# Patient Record
Sex: FEMALE | Race: WHITE | Employment: UNEMPLOYED | URBAN - METROPOLITAN AREA
[De-identification: names, ages, dates, MRNs, and addresses within clinical notes are randomized per-mention and may not be internally consistent; named-entity substitution may affect disease eponyms.]

---

## 2019-09-06 ENCOUNTER — OFFICE VISIT (OUTPATIENT)
Dept: INTERNAL MEDICINE CLINIC | Facility: CLINIC | Age: 59
End: 2019-09-06
Payer: COMMERCIAL

## 2019-09-06 VITALS
DIASTOLIC BLOOD PRESSURE: 89 MMHG | BODY MASS INDEX: 35.44 KG/M2 | WEIGHT: 200 LBS | OXYGEN SATURATION: 90 % | HEART RATE: 80 BPM | HEIGHT: 63 IN | SYSTOLIC BLOOD PRESSURE: 156 MMHG

## 2019-09-06 DIAGNOSIS — I10 ESSENTIAL HYPERTENSION: Primary | ICD-10-CM

## 2019-09-06 DIAGNOSIS — Z12.31 BREAST CANCER SCREENING BY MAMMOGRAM: ICD-10-CM

## 2019-09-06 DIAGNOSIS — Z23 NEED FOR PNEUMOCOCCAL VACCINATION: ICD-10-CM

## 2019-09-06 DIAGNOSIS — E78.5 DYSLIPIDEMIA: ICD-10-CM

## 2019-09-06 DIAGNOSIS — E04.1 THYROID NODULE: ICD-10-CM

## 2019-09-06 DIAGNOSIS — N20.0 NEPHROLITHIASIS: ICD-10-CM

## 2019-09-06 DIAGNOSIS — F32.5 MAJOR DEPRESSIVE DISORDER WITH SINGLE EPISODE, IN FULL REMISSION (HCC): ICD-10-CM

## 2019-09-06 DIAGNOSIS — Z12.11 SCREEN FOR COLON CANCER: ICD-10-CM

## 2019-09-06 PROBLEM — F32.A DEPRESSION: Status: ACTIVE | Noted: 2019-09-06

## 2019-09-06 PROCEDURE — 90732 PPSV23 VACC 2 YRS+ SUBQ/IM: CPT | Performed by: INTERNAL MEDICINE

## 2019-09-06 PROCEDURE — 99204 OFFICE O/P NEW MOD 45 MIN: CPT | Performed by: INTERNAL MEDICINE

## 2019-09-06 PROCEDURE — 90471 IMMUNIZATION ADMIN: CPT | Performed by: INTERNAL MEDICINE

## 2019-09-06 RX ORDER — CARVEDILOL 25 MG/1
1 TABLET ORAL 2 TIMES DAILY
Refills: 1 | COMMUNITY
Start: 2019-08-15 | End: 2019-09-16

## 2019-09-06 RX ORDER — CITALOPRAM 10 MG/1
1 TABLET ORAL DAILY
Refills: 0 | COMMUNITY
Start: 2019-05-13 | End: 2019-09-06

## 2019-09-06 RX ORDER — AMLODIPINE BESYLATE 10 MG/1
1 TABLET ORAL DAILY
Refills: 0 | COMMUNITY
Start: 2019-06-02 | End: 2019-09-16

## 2019-09-06 RX ORDER — DOXAZOSIN 2 MG/1
1 TABLET ORAL NIGHTLY
Refills: 0 | COMMUNITY
Start: 2019-07-26 | End: 2019-09-06

## 2019-09-06 RX ORDER — SIMVASTATIN 40 MG
1 TABLET ORAL DAILY
Refills: 1 | COMMUNITY
Start: 2019-07-08 | End: 2020-07-20

## 2019-09-06 RX ORDER — CITALOPRAM 10 MG/1
10 TABLET ORAL DAILY
Qty: 90 TABLET | Refills: 1 | Status: SHIPPED | OUTPATIENT
Start: 2019-09-06 | End: 2020-03-05

## 2019-09-06 RX ORDER — BENAZEPRIL HYDROCHLORIDE 40 MG/1
1 TABLET, FILM COATED ORAL DAILY
Refills: 1 | COMMUNITY
Start: 2019-08-15 | End: 2019-09-16

## 2019-09-06 RX ORDER — DOXAZOSIN 2 MG/1
2 TABLET ORAL NIGHTLY
Qty: 90 TABLET | Refills: 1 | Status: SHIPPED | OUTPATIENT
Start: 2019-09-06 | End: 2020-02-20

## 2019-09-06 NOTE — PROGRESS NOTES
Juan Pablo Sanches is a 60year old female. Patient presents with:  Establish Care    HPI:   28-year-old female with a past medical history of hypertension, depression, nephrolithiasis, thyroid nodule status post hemithyroidectomy here to establish care.   Patient r Smoker        Years: 30.00        Types: Cigarettes      Smokeless tobacco: Never Used    Alcohol use: Not Currently    Drug use: Not Currently     Family History   Problem Relation Age of Onset   • Cancer Father    • Hypertension Mother    • Heart Disorde hemithyroidectomy. Will check TSH.  - TSH W REFLEX TO FREE T4; Future    3. Dyslipidemia  On statins. Will check lipid profile and adjust medication accordingly.  - LIPID PANEL; Future  - HEMOGLOBIN A1C; Future    4.  Nephrolithiasis  Status post removal

## 2019-09-06 NOTE — PATIENT INSTRUCTIONS
As discussed, your blood pressure is running little bit high. Please try to avoid salt as much as possible and try to abstain from smoking. We will recheck your blood pressure again with next visit and if it is still high we will adjust medications.   We

## 2019-09-17 RX ORDER — BENAZEPRIL HYDROCHLORIDE 40 MG/1
TABLET, FILM COATED ORAL
Qty: 90 TABLET | Refills: 0 | Status: SHIPPED | OUTPATIENT
Start: 2019-09-17 | End: 2019-11-21

## 2019-09-17 RX ORDER — AMLODIPINE BESYLATE 10 MG/1
TABLET ORAL DAILY
Qty: 90 TABLET | Refills: 0 | Status: SHIPPED | OUTPATIENT
Start: 2019-09-17 | End: 2020-03-11

## 2019-09-17 RX ORDER — CARVEDILOL 25 MG/1
TABLET ORAL
Qty: 180 TABLET | Refills: 0 | Status: SHIPPED | OUTPATIENT
Start: 2019-09-17 | End: 2019-10-17

## 2019-10-05 ENCOUNTER — LAB ENCOUNTER (OUTPATIENT)
Dept: LAB | Age: 59
End: 2019-10-05
Attending: INTERNAL MEDICINE
Payer: COMMERCIAL

## 2019-10-05 DIAGNOSIS — E04.1 THYROID NODULE: ICD-10-CM

## 2019-10-05 DIAGNOSIS — I10 ESSENTIAL HYPERTENSION: ICD-10-CM

## 2019-10-05 DIAGNOSIS — E78.5 DYSLIPIDEMIA: ICD-10-CM

## 2019-10-05 PROCEDURE — 36415 COLL VENOUS BLD VENIPUNCTURE: CPT

## 2019-10-05 PROCEDURE — 82274 ASSAY TEST FOR BLOOD FECAL: CPT | Performed by: INTERNAL MEDICINE

## 2019-10-05 PROCEDURE — 84443 ASSAY THYROID STIM HORMONE: CPT

## 2019-10-05 PROCEDURE — 85060 BLOOD SMEAR INTERPRETATION: CPT

## 2019-10-05 PROCEDURE — 80053 COMPREHEN METABOLIC PANEL: CPT

## 2019-10-05 PROCEDURE — 80061 LIPID PANEL: CPT

## 2019-10-05 PROCEDURE — 85027 COMPLETE CBC AUTOMATED: CPT

## 2019-10-05 PROCEDURE — 83036 HEMOGLOBIN GLYCOSYLATED A1C: CPT

## 2019-10-07 DIAGNOSIS — R19.5 HEME POSITIVE STOOL: Primary | ICD-10-CM

## 2019-10-07 NOTE — PROGRESS NOTES
Spoke with patient (verified name and )< advised Dr Shaw Showsanjay note and verbalized understanding. GI specialist information given.     Notes recorded by Dinesh Jiménez MD on 10/7/2019 at 3:27 PM CDT  I reviewed her stool test. Shashi Lawrence is blood in the sto

## 2019-10-13 ENCOUNTER — HOSPITAL ENCOUNTER (EMERGENCY)
Facility: HOSPITAL | Age: 59
Discharge: HOME | End: 2019-10-13
Attending: EMERGENCY MEDICINE
Payer: COMMERCIAL

## 2019-10-13 ENCOUNTER — APPOINTMENT (OUTPATIENT)
Dept: GENERAL RADIOLOGY | Facility: HOSPITAL | Age: 59
End: 2019-10-13
Attending: EMERGENCY MEDICINE
Payer: COMMERCIAL

## 2019-10-13 VITALS
OXYGEN SATURATION: 93 % | TEMPERATURE: 98 F | DIASTOLIC BLOOD PRESSURE: 80 MMHG | HEIGHT: 63 IN | WEIGHT: 190 LBS | SYSTOLIC BLOOD PRESSURE: 149 MMHG | RESPIRATION RATE: 20 BRPM | HEART RATE: 87 BPM | BODY MASS INDEX: 33.66 KG/M2

## 2019-10-13 DIAGNOSIS — M54.16 LUMBAR RADICULOPATHY: Primary | ICD-10-CM

## 2019-10-13 PROCEDURE — 99283 EMERGENCY DEPT VISIT LOW MDM: CPT

## 2019-10-13 PROCEDURE — 72100 X-RAY EXAM L-S SPINE 2/3 VWS: CPT | Performed by: EMERGENCY MEDICINE

## 2019-10-13 RX ORDER — HYDROCODONE BITARTRATE AND ACETAMINOPHEN 5; 325 MG/1; MG/1
1 TABLET ORAL EVERY 6 HOURS PRN
Qty: 10 TABLET | Refills: 0 | Status: SHIPPED | OUTPATIENT
Start: 2019-10-13 | End: 2019-11-07 | Stop reason: ALTCHOICE

## 2019-10-13 RX ORDER — CYCLOBENZAPRINE HCL 10 MG
10 TABLET ORAL 3 TIMES DAILY PRN
Qty: 20 TABLET | Refills: 0 | Status: SHIPPED | OUTPATIENT
Start: 2019-10-13 | End: 2019-10-20

## 2019-10-13 RX ORDER — PREDNISONE 20 MG/1
40 TABLET ORAL ONCE
Status: COMPLETED | OUTPATIENT
Start: 2019-10-13 | End: 2019-10-13

## 2019-10-13 RX ORDER — METHYLPREDNISOLONE 4 MG/1
TABLET ORAL
Qty: 1 PACKAGE | Refills: 0 | Status: SHIPPED | OUTPATIENT
Start: 2019-10-13 | End: 2019-11-07 | Stop reason: ALTCHOICE

## 2019-10-13 RX ORDER — HYDROCODONE BITARTRATE AND ACETAMINOPHEN 5; 325 MG/1; MG/1
1 TABLET ORAL ONCE
Status: COMPLETED | OUTPATIENT
Start: 2019-10-13 | End: 2019-10-13

## 2019-10-13 NOTE — ED NOTES
Pt presents with 8/10 R buttock and leg pain since yesterday. States low back is \"pulling\". Endorses intermittent tingling in RLE.  Denies numbness, urinary problems, n/v/d/f/c.   PMH HTN, thyroid \"problems\", kidney stone, current smoker  AOx3, RR even/

## 2019-10-18 ENCOUNTER — OFFICE VISIT (OUTPATIENT)
Dept: INTERNAL MEDICINE CLINIC | Facility: CLINIC | Age: 59
End: 2019-10-18
Payer: COMMERCIAL

## 2019-10-18 VITALS
SYSTOLIC BLOOD PRESSURE: 156 MMHG | HEART RATE: 73 BPM | DIASTOLIC BLOOD PRESSURE: 83 MMHG | HEIGHT: 63 IN | BODY MASS INDEX: 35.08 KG/M2 | WEIGHT: 198 LBS

## 2019-10-18 DIAGNOSIS — J43.9 PULMONARY EMPHYSEMA, UNSPECIFIED EMPHYSEMA TYPE (HCC): ICD-10-CM

## 2019-10-18 DIAGNOSIS — Z23 NEED FOR INFLUENZA VACCINATION: ICD-10-CM

## 2019-10-18 DIAGNOSIS — R19.5 POSITIVE FECAL OCCULT BLOOD TEST: ICD-10-CM

## 2019-10-18 DIAGNOSIS — I10 ESSENTIAL HYPERTENSION: Primary | ICD-10-CM

## 2019-10-18 DIAGNOSIS — N18.30 STAGE 3 CHRONIC KIDNEY DISEASE (HCC): ICD-10-CM

## 2019-10-18 DIAGNOSIS — D75.1 POLYCYTHEMIA: ICD-10-CM

## 2019-10-18 DIAGNOSIS — M54.41 LOW BACK PAIN WITH RIGHT-SIDED SCIATICA, UNSPECIFIED BACK PAIN LATERALITY, UNSPECIFIED CHRONICITY: ICD-10-CM

## 2019-10-18 PROCEDURE — 90471 IMMUNIZATION ADMIN: CPT | Performed by: INTERNAL MEDICINE

## 2019-10-18 PROCEDURE — 90686 IIV4 VACC NO PRSV 0.5 ML IM: CPT | Performed by: INTERNAL MEDICINE

## 2019-10-18 PROCEDURE — 99214 OFFICE O/P EST MOD 30 MIN: CPT | Performed by: INTERNAL MEDICINE

## 2019-10-18 RX ORDER — CARVEDILOL 25 MG/1
TABLET ORAL
Qty: 180 TABLET | Refills: 1 | Status: SHIPPED | OUTPATIENT
Start: 2019-10-18 | End: 2020-07-19

## 2019-10-18 RX ORDER — ALBUTEROL SULFATE 90 UG/1
2 AEROSOL, METERED RESPIRATORY (INHALATION) EVERY 4 HOURS PRN
Qty: 1 INHALER | Refills: 6 | Status: SHIPPED | OUTPATIENT
Start: 2019-10-18 | End: 2021-05-10

## 2019-10-18 NOTE — TELEPHONE ENCOUNTER
Refill passed per [facility] protocol.     Hypertensive Medications  Protocol Criteria:  · Appointment scheduled in the past 6 months or in the next 3 months  · BMP or CMP in the past 12 months  · Creatinine result < 2  Recent Outpatient Visits

## 2019-10-18 NOTE — PROGRESS NOTES
Mahin Westfall is a 60year old female. Patient presents with:  ER F/U: back and R- leg pain    HPI:   77-year-old female with a past medical history of hypertension, tobacco abuse, dyslipidemia here for follow-up of back pain and for chronic conditions.   She r 81 MG Oral Tab, Take 81 mg by mouth daily. , Disp: , Rfl:   Citalopram Hydrobromide 10 MG Oral Tab, Take 1 tablet (10 mg total) by mouth daily. , Disp: 90 tablet, Rfl: 1  Doxazosin Mesylate 2 MG Oral Tab, Take 1 tablet (2 mg total) by mouth nightly., Disp: 9 and time. She appears well-nourished. HENT:   Head: Normocephalic. Eyes: Conjunctivae are normal.   Neck: Neck supple. Cardiovascular: Normal rate, regular rhythm and normal heart sounds. No murmur heard. Edema not present.   Pulmonary/Chest: Eff gastroneurology. 6. Low back pain with right-sided sciatica, unspecified back pain laterality, unspecified chronicity  We will continue with muscle relaxants and steroids.   If there is no improvement, she needs physical therapy.  - PHYSICAL THERAPY - IN

## 2019-10-31 ENCOUNTER — APPOINTMENT (OUTPATIENT)
Dept: HEMATOLOGY/ONCOLOGY | Facility: HOSPITAL | Age: 59
End: 2019-10-31
Attending: INTERNAL MEDICINE
Payer: COMMERCIAL

## 2019-11-07 ENCOUNTER — APPOINTMENT (OUTPATIENT)
Dept: LAB | Facility: HOSPITAL | Age: 59
End: 2019-11-07
Attending: INTERNAL MEDICINE
Payer: COMMERCIAL

## 2019-11-07 ENCOUNTER — OFFICE VISIT (OUTPATIENT)
Dept: HEMATOLOGY/ONCOLOGY | Facility: HOSPITAL | Age: 59
End: 2019-11-07
Attending: INTERNAL MEDICINE
Payer: COMMERCIAL

## 2019-11-07 VITALS
OXYGEN SATURATION: 93 % | TEMPERATURE: 98 F | HEIGHT: 63 IN | DIASTOLIC BLOOD PRESSURE: 65 MMHG | HEART RATE: 79 BPM | SYSTOLIC BLOOD PRESSURE: 137 MMHG | RESPIRATION RATE: 16 BRPM | WEIGHT: 193 LBS | BODY MASS INDEX: 34.2 KG/M2

## 2019-11-07 DIAGNOSIS — D75.1 ERYTHROCYTOSIS: ICD-10-CM

## 2019-11-07 DIAGNOSIS — R09.02 HYPOXIA: ICD-10-CM

## 2019-11-07 DIAGNOSIS — R09.02 HYPOXIA: Primary | ICD-10-CM

## 2019-11-07 DIAGNOSIS — I10 ESSENTIAL HYPERTENSION: ICD-10-CM

## 2019-11-07 DIAGNOSIS — J43.0 UNILATERAL EMPHYSEMA (HCC): ICD-10-CM

## 2019-11-07 DIAGNOSIS — D75.1 POLYCYTHEMIA: ICD-10-CM

## 2019-11-07 PROCEDURE — 82728 ASSAY OF FERRITIN: CPT

## 2019-11-07 PROCEDURE — 36415 COLL VENOUS BLD VENIPUNCTURE: CPT

## 2019-11-07 PROCEDURE — 82668 ASSAY OF ERYTHROPOIETIN: CPT

## 2019-11-07 PROCEDURE — 99204 OFFICE O/P NEW MOD 45 MIN: CPT | Performed by: INTERNAL MEDICINE

## 2019-11-10 PROBLEM — R09.02 HYPOXIA: Status: ACTIVE | Noted: 2019-11-10

## 2019-11-10 NOTE — CONSULTS
[facility] History and Physical    Patient Name: Verlon Call   YOB: 1960   Medical Record Number: [id number]   CSN: [id number]   Attending Physician:  Janis Oden MD       Date of Visit: 11/7/2019     Chief Complaint:  Patient presents with 0  •  simvastatin 40 MG Oral Tab, Take 1 tablet by mouth daily. , Disp: , Rfl: 1  •  aspirin 81 MG Oral Tab, Take 81 mg by mouth daily. , Disp: , Rfl:   •  Citalopram Hydrobromide 10 MG Oral Tab, Take 1 tablet (10 mg total) by mouth daily. , Disp: 90 tablet, 10/05/2019    ALT 18 10/05/2019    AST 21 10/05/2019    BILT 0.5 10/05/2019    ALB 3.6 10/05/2019    TP 6.9 10/05/2019       Radiology reviewed:  Xr Lumbar Spine (min 2 Views) (cpt=72100)    Result Date: 10/13/2019  PROCEDURE: XR LUMBAR SPINE (MIN 2 VIEWS) lung, patient will return after completion. Avoid phlebotomy for the time being given lack of sx and phlebotomy will worsen her oxygenation. Smoker  - counseling provided.  Referral to counseling clinic provided today  - She qualifies for CT surveillan

## 2019-11-21 ENCOUNTER — OFFICE VISIT (OUTPATIENT)
Dept: HEMATOLOGY/ONCOLOGY | Facility: HOSPITAL | Age: 59
End: 2019-11-21
Attending: INTERNAL MEDICINE
Payer: COMMERCIAL

## 2019-11-21 VITALS
HEART RATE: 83 BPM | RESPIRATION RATE: 18 BRPM | TEMPERATURE: 99 F | DIASTOLIC BLOOD PRESSURE: 57 MMHG | WEIGHT: 194 LBS | SYSTOLIC BLOOD PRESSURE: 134 MMHG | BODY MASS INDEX: 34.37 KG/M2 | HEIGHT: 63 IN | OXYGEN SATURATION: 92 %

## 2019-11-21 DIAGNOSIS — J43.0 UNILATERAL EMPHYSEMA (HCC): ICD-10-CM

## 2019-11-21 DIAGNOSIS — D75.1 POLYCYTHEMIA: Primary | ICD-10-CM

## 2019-11-21 DIAGNOSIS — I10 ESSENTIAL HYPERTENSION: ICD-10-CM

## 2019-11-21 DIAGNOSIS — R09.02 HYPOXIA: ICD-10-CM

## 2019-11-21 PROCEDURE — 99214 OFFICE O/P EST MOD 30 MIN: CPT | Performed by: INTERNAL MEDICINE

## 2019-11-21 RX ORDER — BENAZEPRIL HYDROCHLORIDE 40 MG/1
TABLET, FILM COATED ORAL
Qty: 90 TABLET | Refills: 1 | Status: SHIPPED | OUTPATIENT
Start: 2019-11-21 | End: 2020-07-19

## 2019-11-21 NOTE — TELEPHONE ENCOUNTER
Refill passed per [facility] protocol.     Hypertensive Medications  Protocol Criteria:  · Appointment scheduled in the past 6 months or in the next 3 months  · BMP or CMP in the past 12 months  · Creatinine result < 2  Recent Outpatient Visits

## 2019-11-21 NOTE — PROGRESS NOTES
[facility]cer Center Progress Note    Patient Name: Kraig Reeves   YOB: 1960   Medical Record Number: [id number]   Attending Physician: Lucy Ocampo M.D.      Chief Complaint:  Abnormal labs    Oncology History:  Kraig Reeves is a 60year old female bein TAKE 1 TABLET BY MOUTH DAILY, Disp: 90 tablet, Rfl: 0  •  simvastatin 40 MG Oral Tab, Take 1 tablet by mouth daily. , Disp: , Rfl: 1  •  aspirin 81 MG Oral Tab, Take 81 mg by mouth daily. , Disp: , Rfl:   •  Citalopram Hydrobromide 10 MG Oral Tab, Take 1 tab Avoid phlebotomy for the time being given lack of sx and phlebotomy will worsen her oxygenation, but is she does become sx, can consider it.      Smoker  - counseling provided previously  - CT Chest denied, will try again after ccxr    HTN- controlled.

## 2019-11-22 ENCOUNTER — APPOINTMENT (OUTPATIENT)
Dept: CT IMAGING | Facility: HOSPITAL | Age: 59
End: 2019-11-22
Attending: INTERNAL MEDICINE
Payer: COMMERCIAL

## 2019-11-22 ENCOUNTER — HOSPITAL ENCOUNTER (OUTPATIENT)
Dept: RESPIRATORY THERAPY | Facility: HOSPITAL | Age: 59
Discharge: HOME | End: 2019-11-22
Attending: INTERNAL MEDICINE
Payer: COMMERCIAL

## 2019-11-27 ENCOUNTER — OFFICE VISIT (OUTPATIENT)
Dept: INTERNAL MEDICINE CLINIC | Facility: CLINIC | Age: 59
End: 2019-11-27
Payer: COMMERCIAL

## 2019-11-27 VITALS
HEART RATE: 76 BPM | BODY MASS INDEX: 34.55 KG/M2 | HEIGHT: 63 IN | SYSTOLIC BLOOD PRESSURE: 134 MMHG | OXYGEN SATURATION: 90 % | WEIGHT: 195 LBS | DIASTOLIC BLOOD PRESSURE: 77 MMHG

## 2019-11-27 DIAGNOSIS — I10 ESSENTIAL HYPERTENSION: ICD-10-CM

## 2019-11-27 DIAGNOSIS — Z12.11 SCREEN FOR COLON CANCER: Primary | ICD-10-CM

## 2019-11-27 DIAGNOSIS — D75.1 POLYCYTHEMIA: ICD-10-CM

## 2019-11-27 DIAGNOSIS — J43.0 UNILATERAL EMPHYSEMA (HCC): ICD-10-CM

## 2019-11-27 PROCEDURE — 99214 OFFICE O/P EST MOD 30 MIN: CPT | Performed by: INTERNAL MEDICINE

## 2019-11-27 NOTE — PROGRESS NOTES
Ilan Trujillo is a 60year old female. Patient presents with:  Hypertension: follow-up    HPI:   59-year-old female with a past medical history of hypertension, COPD, polycythemia, tobacco abuse here for follow-up. She reports that she is doing okay.   She den Cancer Father    • Hypertension Mother    • Heart Disorder Sister    • Heart Disorder Brother    • Obesity Brother         Penicillins             BLEEDING     REVIEW OF SYSTEMS:     Review of Systems   Constitutional: Negative for activity change, appetit polycythemia most likely from COPD/hypoxemia. PFT has been ordered. She eventually needs 6-minute walk test    4.  emphysema ([address]  I discussed in length regarding the importance of smoking cessation. She is up-to-date with PPSV23.     Plan: as above

## 2019-12-02 ENCOUNTER — HOSPITAL ENCOUNTER (OUTPATIENT)
Dept: RESPIRATORY THERAPY | Facility: HOSPITAL | Age: 59
Discharge: HOME | End: 2019-12-02
Attending: INTERNAL MEDICINE
Payer: COMMERCIAL

## 2019-12-02 DIAGNOSIS — J43.9 PULMONARY EMPHYSEMA, UNSPECIFIED EMPHYSEMA TYPE (HCC): ICD-10-CM

## 2019-12-02 PROCEDURE — 94726 PLETHYSMOGRAPHY LUNG VOLUMES: CPT | Performed by: INTERNAL MEDICINE

## 2019-12-02 PROCEDURE — 94060 EVALUATION OF WHEEZING: CPT | Performed by: INTERNAL MEDICINE

## 2019-12-02 PROCEDURE — 94729 DIFFUSING CAPACITY: CPT | Performed by: INTERNAL MEDICINE

## 2019-12-09 NOTE — ADDENDUM NOTE
Encounter addended by: Radha Gates MD on: 12/9/2019 3:27 PM   Actions taken: Clinical Note Signed, Charge Capture section accepted

## 2019-12-09 NOTE — PROCEDURES
[address] [place] [address] Dr WADDELL 1960 MRN [id number]   Height  610 Age 60year old   Weight  195 lbs Sex Female         Spirometry:   FEV1 1.19 L which is 49%  FEV1/FVC is 56% which is reduced      FVL:   TLC

## 2019-12-10 NOTE — PROGRESS NOTES
Dr Phyllis Holt pulmonology is booking for January, does she need to be seen before January? I have pended the referral for you

## 2019-12-14 ENCOUNTER — HOSPITAL ENCOUNTER (OUTPATIENT)
Dept: ULTRASOUND IMAGING | Facility: HOSPITAL | Age: 59
Discharge: HOME | End: 2019-12-14
Attending: INTERNAL MEDICINE
Payer: COMMERCIAL

## 2019-12-14 DIAGNOSIS — N18.30 STAGE 3 CHRONIC KIDNEY DISEASE (HCC): ICD-10-CM

## 2019-12-14 PROCEDURE — 76775 US EXAM ABDO BACK WALL LIM: CPT | Performed by: INTERNAL MEDICINE

## 2019-12-16 DIAGNOSIS — N18.9 CHRONIC KIDNEY DISEASE, UNSPECIFIED CKD STAGE: Primary | ICD-10-CM

## 2020-01-02 ENCOUNTER — NURSE TRIAGE (OUTPATIENT)
Dept: OTHER | Age: 60
End: 2020-01-02

## 2020-01-02 NOTE — TELEPHONE ENCOUNTER
Late entry. 12/23, pt's  called back and spoke with Agus Lucas RN expressing concern with ERA and information about Dr. Destiney Blancas recommendations on [address] result.   expressed his concern with delivery of \"horrible\" news days before

## 2020-01-19 RX ORDER — CARVEDILOL 25 MG/1
TABLET ORAL
Qty: 180 TABLET | Refills: 1 | OUTPATIENT
Start: 2020-01-19

## 2020-01-21 RX ORDER — CITALOPRAM HYDROBROMIDE 10 MG/1
TABLET ORAL
Qty: 90 TABLET | Refills: 1 | OUTPATIENT
Start: 2020-01-21

## 2020-02-20 RX ORDER — DOXAZOSIN 2 MG/1
TABLET ORAL
Qty: 90 TABLET | Refills: 1 | Status: SHIPPED | OUTPATIENT
Start: 2020-02-20 | End: 2020-08-28

## 2020-02-28 ENCOUNTER — OFFICE VISIT (OUTPATIENT)
Dept: INTERNAL MEDICINE CLINIC | Facility: CLINIC | Age: 60
End: 2020-02-28
Payer: COMMERCIAL

## 2020-02-28 VITALS
DIASTOLIC BLOOD PRESSURE: 82 MMHG | HEIGHT: 63 IN | BODY MASS INDEX: 34.02 KG/M2 | HEART RATE: 80 BPM | SYSTOLIC BLOOD PRESSURE: 165 MMHG | WEIGHT: 192 LBS

## 2020-02-28 DIAGNOSIS — H11.30 CONJUNCTIVAL HEMORRHAGE, UNSPECIFIED LATERALITY: Primary | ICD-10-CM

## 2020-02-28 PROCEDURE — 99213 OFFICE O/P EST LOW 20 MIN: CPT | Performed by: INTERNAL MEDICINE

## 2020-02-28 NOTE — PROGRESS NOTES
Jimenez Ingram is a 60year old female. Patient presents with:  Eye Problem: poked R-eye by mistake, redness, slight swelling, blurry vision    HPI:    69-year-old female here for evaluation of redness in the right eye.   She reports that she accidentally poke Penicillins             BLEEDING     REVIEW OF SYSTEMS:     Review of Systems   Constitutional: Negative for activity change, appetite change and fever. HENT: Negative for congestion and voice change. Eyes: Positive for redness.    Respiratory: Neg

## 2020-03-05 RX ORDER — CITALOPRAM 10 MG/1
10 TABLET ORAL DAILY
Qty: 90 TABLET | Refills: 1 | Status: SHIPPED | OUTPATIENT
Start: 2020-03-05 | End: 2020-03-06

## 2020-03-05 NOTE — TELEPHONE ENCOUNTER
Refill Protocol Appointment Criteria  · Appointment scheduled in the past 6 months or in the next 3 months  Recent Outpatient Visits            6 days ago Conjunctival hemorrhage, unspecified laterality    [facility], [facility], [address], [facility]

## 2020-03-06 RX ORDER — CITALOPRAM 10 MG/1
TABLET ORAL
Qty: 90 TABLET | Refills: 1 | Status: SHIPPED | OUTPATIENT
Start: 2020-03-06 | End: 2020-08-28

## 2020-03-06 NOTE — TELEPHONE ENCOUNTER
Refill passed per [facility] protocol.   Refill Protocol Appointment Criteria  · Appointment scheduled in the past 6 months or in the next 3 months  Recent Outpatient Visits            1 week ago Conjunctival hemorrhage, unspecified laterality    Elu

## 2020-03-11 RX ORDER — AMLODIPINE BESYLATE 10 MG/1
TABLET ORAL DAILY
Qty: 90 TABLET | Refills: 1 | Status: SHIPPED | OUTPATIENT
Start: 2020-03-11 | End: 2020-09-09

## 2020-03-11 NOTE — TELEPHONE ENCOUNTER
Patient needs a refill on    AMLODIPINE BESYLATE 10 MG Oral Tab   09/17/19  -- Delma Padgett MD     TAKE 1 TABLET BY MOUTH DAILY         Thank you.

## 2020-03-16 ENCOUNTER — TELEPHONE (OUTPATIENT)
Dept: HEMATOLOGY/ONCOLOGY | Facility: HOSPITAL | Age: 60
End: 2020-03-16

## 2020-03-16 NOTE — TELEPHONE ENCOUNTER
Left message canceling pt for her f/u on 3/19. Due to the covid-19 pandemic, we are canceling all non emergent appointments at this time. I asked that she please have her labs drawn and we will call with the results.

## 2020-06-18 RX ORDER — AMLODIPINE BESYLATE 10 MG/1
TABLET ORAL DAILY
Qty: 90 TABLET | Refills: 1 | OUTPATIENT
Start: 2020-06-18

## 2020-06-18 NOTE — TELEPHONE ENCOUNTER
Spoke with Aric Mahajan from [facility] who confirmed the patient already picked up the amlodipine on 6/7/20 so she does not need a refill.

## 2020-07-18 NOTE — TELEPHONE ENCOUNTER
Patient is requesting a refill, she has changed pharmacy to [address] [place] [address] and could not request the refill  CSS updated demographics.        BENAZEPRIL HCL 40 MG Oral Tab    simvastatin 40 MG Oral Tab    CARVEDILOL 25 MG Oral Tab

## 2020-07-18 NOTE — TELEPHONE ENCOUNTER
Patient has updated her pharmacy to [facility] in [place] and no longer needs medications sent to [facility].

## 2020-07-19 RX ORDER — BENAZEPRIL HYDROCHLORIDE 40 MG/1
TABLET, FILM COATED ORAL
Qty: 90 TABLET | Refills: 1 | Status: SHIPPED | OUTPATIENT
Start: 2020-07-19 | End: 2020-07-20

## 2020-07-19 RX ORDER — CARVEDILOL 25 MG/1
TABLET ORAL
Qty: 180 TABLET | Refills: 1 | Status: SHIPPED | OUTPATIENT
Start: 2020-07-19 | End: 2020-07-20

## 2020-07-21 RX ORDER — BENAZEPRIL HYDROCHLORIDE 40 MG/1
40 TABLET, FILM COATED ORAL DAILY
Qty: 90 TABLET | Refills: 1 | Status: SHIPPED | OUTPATIENT
Start: 2020-07-21 | End: 2021-01-12

## 2020-07-21 RX ORDER — CARVEDILOL 25 MG/1
25 TABLET ORAL 2 TIMES DAILY
Qty: 180 TABLET | Refills: 1 | Status: SHIPPED | OUTPATIENT
Start: 2020-07-21 | End: 2021-01-12

## 2020-07-21 RX ORDER — SIMVASTATIN 40 MG
40 TABLET ORAL DAILY
Qty: 30 TABLET | Refills: 1 | Status: SHIPPED | OUTPATIENT
Start: 2020-07-21 | End: 2020-10-01

## 2020-07-21 NOTE — TELEPHONE ENCOUNTER
Patient is calling to follow up and is requesting that her medications be sent to [facility] ([facility]). Medications were sent to [facility] in [place] and patient states she needs medications to be sent to [address] [place] [address].

## 2020-07-21 NOTE — TELEPHONE ENCOUNTER
The patient was called and informed the medications were sent to the [address] [place] [address].

## 2020-07-24 ENCOUNTER — OFFICE VISIT (OUTPATIENT)
Dept: INTERNAL MEDICINE CLINIC | Facility: CLINIC | Age: 60
End: 2020-07-24
Payer: COMMERCIAL

## 2020-07-24 VITALS
DIASTOLIC BLOOD PRESSURE: 69 MMHG | SYSTOLIC BLOOD PRESSURE: 120 MMHG | BODY MASS INDEX: 32.43 KG/M2 | WEIGHT: 183 LBS | HEART RATE: 72 BPM | HEIGHT: 63 IN | TEMPERATURE: 98 F

## 2020-07-24 DIAGNOSIS — J43.0 UNILATERAL EMPHYSEMA (HCC): ICD-10-CM

## 2020-07-24 DIAGNOSIS — Z12.31 BREAST CANCER SCREENING BY MAMMOGRAM: ICD-10-CM

## 2020-07-24 DIAGNOSIS — I10 ESSENTIAL HYPERTENSION: Primary | ICD-10-CM

## 2020-07-24 DIAGNOSIS — J41.0 SIMPLE CHRONIC BRONCHITIS (HCC): ICD-10-CM

## 2020-07-24 DIAGNOSIS — E78.5 DYSLIPIDEMIA: ICD-10-CM

## 2020-07-24 DIAGNOSIS — Z12.4 CERVICAL CANCER SCREENING: ICD-10-CM

## 2020-07-24 DIAGNOSIS — Z12.2 ENCOUNTER FOR SCREENING FOR LUNG CANCER: ICD-10-CM

## 2020-07-24 PROCEDURE — 3008F BODY MASS INDEX DOCD: CPT | Performed by: INTERNAL MEDICINE

## 2020-07-24 PROCEDURE — 3074F SYST BP LT 130 MM HG: CPT | Performed by: INTERNAL MEDICINE

## 2020-07-24 PROCEDURE — 3078F DIAST BP <80 MM HG: CPT | Performed by: INTERNAL MEDICINE

## 2020-07-24 PROCEDURE — 99214 OFFICE O/P EST MOD 30 MIN: CPT | Performed by: INTERNAL MEDICINE

## 2020-07-24 NOTE — PROGRESS NOTES
Leo Luna is a 63year old female. Patient presents with:  Hypertension    HPI:   34-year-old lady with a past medical history of severe COPD, hypertension, dyslipidemia, mild depression here for follow-up. She continues to smoke.   We have discussed in Smokeless tobacco: Never Used    Alcohol use: Not Currently    Drug use: Not Currently     Family History   Problem Relation Age of Onset   • Cancer Father    • Hypertension Mother    • Heart Disorder Sister    • Heart Disorder Brother    • Obesity Brother discussed. Flu vaccine, PPSV23 up-to-date. I have discussed regarding pulmonary consultation and she want to do it later. Continue with the pro-air on a as needed basis. She only uses it a couple of times a week.     4. Cervical cancer screening    - OB

## 2020-08-28 RX ORDER — DOXAZOSIN 2 MG/1
2 TABLET ORAL NIGHTLY
Qty: 90 TABLET | Refills: 1 | Status: SHIPPED | OUTPATIENT
Start: 2020-08-28 | End: 2020-11-15

## 2020-08-28 RX ORDER — CITALOPRAM 10 MG/1
TABLET ORAL
Qty: 90 TABLET | Refills: 1 | Status: SHIPPED | OUTPATIENT
Start: 2020-08-28 | End: 2021-02-22

## 2020-08-28 NOTE — TELEPHONE ENCOUNTER
Patient is requesting refill of medications CITALOPRAM HYDROBROMIDE 10 MG Oral Tab and DOXAZOSIN MESYLATE 2 MG Oral Tab. Patient states she contacted PCP Dr. Earnest Martinez office to initiate refill request due to recently changing pharmacies.      Pa

## 2020-09-09 RX ORDER — AMLODIPINE BESYLATE 10 MG/1
10 TABLET ORAL DAILY
Qty: 90 TABLET | Refills: 1 | Status: SHIPPED | OUTPATIENT
Start: 2020-09-09 | End: 2021-02-22

## 2020-09-09 NOTE — TELEPHONE ENCOUNTER
Patient called in stating that she had switched pharmacy and they requested she contact the office to initiate request.     Patient states that she has about 1-2 tablets left. Confirmed pharmacy on encounter. Please advise.        Current Outpatient

## 2020-09-10 ENCOUNTER — TELEPHONE (OUTPATIENT)
Dept: INTERNAL MEDICINE CLINIC | Facility: CLINIC | Age: 60
End: 2020-09-10

## 2020-10-01 RX ORDER — SIMVASTATIN 40 MG
TABLET ORAL
Qty: 30 TABLET | Refills: 0 | Status: SHIPPED | OUTPATIENT
Start: 2020-10-01 | End: 2020-11-16

## 2020-11-15 RX ORDER — DOXAZOSIN 2 MG/1
TABLET ORAL
Qty: 90 TABLET | Refills: 1 | Status: SHIPPED | OUTPATIENT
Start: 2020-11-15 | End: 2021-05-14

## 2020-11-16 ENCOUNTER — TELEPHONE (OUTPATIENT)
Dept: INTERNAL MEDICINE CLINIC | Facility: CLINIC | Age: 60
End: 2020-11-16

## 2020-11-16 RX ORDER — SIMVASTATIN 40 MG
40 TABLET ORAL DAILY
Qty: 90 TABLET | Refills: 1 | Status: SHIPPED | OUTPATIENT
Start: 2020-11-16 | End: 2021-05-24

## 2020-11-16 NOTE — TELEPHONE ENCOUNTER
Pt informed that she needs to complete mammogram, states new insurance will be valid next month and will complete it.

## 2021-01-12 RX ORDER — BENAZEPRIL HYDROCHLORIDE 40 MG/1
TABLET, FILM COATED ORAL
Qty: 90 TABLET | Refills: 0 | Status: SHIPPED | OUTPATIENT
Start: 2021-01-12 | End: 2021-04-19

## 2021-01-12 RX ORDER — CARVEDILOL 25 MG/1
TABLET ORAL
Qty: 180 TABLET | Refills: 0 | Status: SHIPPED | OUTPATIENT
Start: 2021-01-12 | End: 2021-04-19

## 2021-02-22 RX ORDER — AMLODIPINE BESYLATE 10 MG/1
TABLET ORAL
Qty: 90 TABLET | Refills: 0 | Status: SHIPPED | OUTPATIENT
Start: 2021-02-22 | End: 2021-05-24

## 2021-02-22 RX ORDER — CITALOPRAM 10 MG/1
TABLET ORAL
Qty: 90 TABLET | Refills: 0 | Status: SHIPPED | OUTPATIENT
Start: 2021-02-22 | End: 2021-05-24

## 2021-03-17 DIAGNOSIS — Z23 NEED FOR VACCINATION: ICD-10-CM

## 2021-04-19 RX ORDER — CARVEDILOL 25 MG/1
TABLET ORAL
Qty: 180 TABLET | Refills: 0 | Status: SHIPPED | OUTPATIENT
Start: 2021-04-19 | End: 2021-07-23

## 2021-04-19 RX ORDER — BENAZEPRIL HYDROCHLORIDE 40 MG/1
TABLET, FILM COATED ORAL
Qty: 90 TABLET | Refills: 0 | Status: SHIPPED | OUTPATIENT
Start: 2021-04-19 | End: 2021-07-23

## 2021-05-10 ENCOUNTER — OFFICE VISIT (OUTPATIENT)
Dept: INTERNAL MEDICINE CLINIC | Facility: CLINIC | Age: 61
End: 2021-05-10
Payer: COMMERCIAL

## 2021-05-10 VITALS
SYSTOLIC BLOOD PRESSURE: 132 MMHG | DIASTOLIC BLOOD PRESSURE: 82 MMHG | OXYGEN SATURATION: 95 % | RESPIRATION RATE: 16 BRPM | WEIGHT: 197 LBS | BODY MASS INDEX: 34.91 KG/M2 | HEART RATE: 70 BPM | HEIGHT: 63 IN

## 2021-05-10 DIAGNOSIS — Z12.31 BREAST CANCER SCREENING BY MAMMOGRAM: ICD-10-CM

## 2021-05-10 DIAGNOSIS — Z12.4 SCREENING FOR CERVICAL CANCER: ICD-10-CM

## 2021-05-10 DIAGNOSIS — E78.5 DYSLIPIDEMIA: ICD-10-CM

## 2021-05-10 DIAGNOSIS — M25.562 CHRONIC PAIN OF LEFT KNEE: ICD-10-CM

## 2021-05-10 DIAGNOSIS — Z00.00 ADULT GENERAL MEDICAL EXAM: ICD-10-CM

## 2021-05-10 DIAGNOSIS — Z12.11 SCREEN FOR COLON CANCER: ICD-10-CM

## 2021-05-10 DIAGNOSIS — G89.29 CHRONIC PAIN OF LEFT KNEE: ICD-10-CM

## 2021-05-10 DIAGNOSIS — I10 ESSENTIAL HYPERTENSION: Primary | ICD-10-CM

## 2021-05-10 PROCEDURE — 3008F BODY MASS INDEX DOCD: CPT | Performed by: INTERNAL MEDICINE

## 2021-05-10 PROCEDURE — 3075F SYST BP GE 130 - 139MM HG: CPT | Performed by: INTERNAL MEDICINE

## 2021-05-10 PROCEDURE — 3079F DIAST BP 80-89 MM HG: CPT | Performed by: INTERNAL MEDICINE

## 2021-05-10 PROCEDURE — 99214 OFFICE O/P EST MOD 30 MIN: CPT | Performed by: INTERNAL MEDICINE

## 2021-05-10 RX ORDER — ALBUTEROL SULFATE 90 UG/1
2 AEROSOL, METERED RESPIRATORY (INHALATION) EVERY 4 HOURS PRN
Qty: 1 INHALER | Refills: 6 | Status: SHIPPED | OUTPATIENT
Start: 2021-05-10

## 2021-05-10 NOTE — PROGRESS NOTES
Sarah Caba is a 63year old female. Patient presents with: Follow - Up  Hypertension    HPI:   65-year-old lady with a past medical history of hypertension, dyslipidemia, tobacco abuse here for follow-up.   She reports that she has been taking her medicat Date   • OTHER SURGICAL HISTORY      kidney stone   • OTHER SURGICAL HISTORY      thyroid removal      Social History:  Social History    Tobacco Use      Smoking status: Current Every Day Smoker        Years: 30.00        Types: Cigarettes      Smokeless General: Bowel sounds are normal.      Palpations: Abdomen is soft. Tenderness: There is no abdominal tenderness. Musculoskeletal:      Cervical back: Neck supple. Right lower leg: No edema. Left lower leg: No edema.    Skin:     General: S Pap smear. Tobacco abstinence discussed. I will see her back in 4 months      The patient indicates understanding of these issues and agrees to the plan. No follow-ups on file.

## 2021-05-14 RX ORDER — DOXAZOSIN 2 MG/1
TABLET ORAL
Qty: 90 TABLET | Refills: 1 | Status: SHIPPED | OUTPATIENT
Start: 2021-05-14 | End: 2021-11-10

## 2021-05-24 ENCOUNTER — TELEPHONE (OUTPATIENT)
Dept: INTERNAL MEDICINE CLINIC | Facility: CLINIC | Age: 61
End: 2021-05-24

## 2021-05-24 RX ORDER — SIMVASTATIN 40 MG
40 TABLET ORAL DAILY
Qty: 90 TABLET | Refills: 1 | Status: SHIPPED | OUTPATIENT
Start: 2021-05-24 | End: 2021-11-17

## 2021-05-24 RX ORDER — AMLODIPINE BESYLATE 10 MG/1
10 TABLET ORAL DAILY
Qty: 90 TABLET | Refills: 0 | Status: SHIPPED | OUTPATIENT
Start: 2021-05-24 | End: 2021-08-12

## 2021-05-24 RX ORDER — CITALOPRAM 10 MG/1
TABLET ORAL
Qty: 90 TABLET | Refills: 0 | Status: SHIPPED | OUTPATIENT
Start: 2021-05-24 | End: 2021-08-20

## 2021-05-24 NOTE — TELEPHONE ENCOUNTER
Patient is requesting a refill, send  to [id number]. Patient is out of simvastatin 40 mg.        Citalopram Hydrobromide 10 MG Oral Tab    simvastatin 40 MG Oral Tab    AMLODIPINE BESYLATE 10 MG Oral Tab

## 2021-05-24 NOTE — TELEPHONE ENCOUNTER
Patient wanted to notify Dr. Katt Mccray that she had to reschedule her mammogram 4 weeks out due to having her covid-19 vaccine.

## 2021-06-12 ENCOUNTER — HOSPITAL ENCOUNTER (OUTPATIENT)
Dept: MAMMOGRAPHY | Facility: HOSPITAL | Age: 61
Discharge: HOME | End: 2021-06-12
Attending: INTERNAL MEDICINE
Payer: COMMERCIAL

## 2021-06-12 ENCOUNTER — LAB ENCOUNTER (OUTPATIENT)
Dept: LAB | Facility: HOSPITAL | Age: 61
End: 2021-06-12
Attending: INTERNAL MEDICINE
Payer: COMMERCIAL

## 2021-06-12 DIAGNOSIS — Z00.00 ADULT GENERAL MEDICAL EXAM: ICD-10-CM

## 2021-06-12 DIAGNOSIS — Z12.31 BREAST CANCER SCREENING BY MAMMOGRAM: ICD-10-CM

## 2021-06-12 PROCEDURE — 80061 LIPID PANEL: CPT

## 2021-06-12 PROCEDURE — 80053 COMPREHEN METABOLIC PANEL: CPT

## 2021-06-12 PROCEDURE — 36415 COLL VENOUS BLD VENIPUNCTURE: CPT

## 2021-06-12 PROCEDURE — 85027 COMPLETE CBC AUTOMATED: CPT

## 2021-06-12 PROCEDURE — 77063 BREAST TOMOSYNTHESIS BI: CPT | Performed by: INTERNAL MEDICINE

## 2021-06-12 PROCEDURE — 85060 BLOOD SMEAR INTERPRETATION: CPT

## 2021-06-12 PROCEDURE — 84443 ASSAY THYROID STIM HORMONE: CPT

## 2021-06-12 PROCEDURE — 77067 SCR MAMMO BI INCL CAD: CPT | Performed by: INTERNAL MEDICINE

## 2021-06-12 PROCEDURE — 83036 HEMOGLOBIN GLYCOSYLATED A1C: CPT

## 2021-07-02 ENCOUNTER — HOSPITAL ENCOUNTER (OUTPATIENT)
Dept: MAMMOGRAPHY | Facility: HOSPITAL | Age: 61
Discharge: HOME | End: 2021-07-02
Attending: INTERNAL MEDICINE
Payer: COMMERCIAL

## 2021-07-02 ENCOUNTER — HOSPITAL ENCOUNTER (OUTPATIENT)
Dept: ULTRASOUND IMAGING | Facility: HOSPITAL | Age: 61
Discharge: HOME | End: 2021-07-02
Attending: INTERNAL MEDICINE
Payer: COMMERCIAL

## 2021-07-02 DIAGNOSIS — R92.8 ABNORMAL MAMMOGRAM: ICD-10-CM

## 2021-07-02 PROCEDURE — 76642 ULTRASOUND BREAST LIMITED: CPT | Performed by: INTERNAL MEDICINE

## 2021-07-02 PROCEDURE — 77065 DX MAMMO INCL CAD UNI: CPT | Performed by: INTERNAL MEDICINE

## 2021-07-02 PROCEDURE — 77061 BREAST TOMOSYNTHESIS UNI: CPT | Performed by: INTERNAL MEDICINE

## 2021-07-23 RX ORDER — CARVEDILOL 25 MG/1
25 TABLET ORAL 2 TIMES DAILY
Qty: 180 TABLET | Refills: 1 | Status: SHIPPED | OUTPATIENT
Start: 2021-07-23 | End: 2022-01-21

## 2021-07-23 RX ORDER — BENAZEPRIL HYDROCHLORIDE 40 MG/1
40 TABLET, FILM COATED ORAL DAILY
Qty: 90 TABLET | Refills: 1 | Status: SHIPPED | OUTPATIENT
Start: 2021-07-23 | End: 2022-01-14

## 2021-07-23 NOTE — TELEPHONE ENCOUNTER
Pt would like a refill on Rx Carvedilol and Rx Benazepril. Pt updated pharmacy [facility].  Please advise       Current Outpatient Medications   Medication Sig Dispense Refill       0       0       1       1       6   • BENAZEPRIL HCL 40 MG Oral Ta

## 2021-08-06 ENCOUNTER — TELEPHONE (OUTPATIENT)
Dept: INTERNAL MEDICINE CLINIC | Facility: CLINIC | Age: 61
End: 2021-08-06

## 2021-08-06 NOTE — TELEPHONE ENCOUNTER
I have placed an order for x-ray during the last office visit. Please ask her to complete it. If she continues to have knee pain, she may need to be seen by orthopedics for intra-articular injections.   Thank you

## 2021-08-09 NOTE — TELEPHONE ENCOUNTER
Saudsanjay Sullivan notified X-Ray order was generated last OV,Wife should get it done. if her Knee still hurts then she should see an Orthopedic for Intra-Articular Injections. Dolly Sullivan stated she had her X-Ray done already. Advised we don't have results in her ozzie

## 2021-08-12 RX ORDER — AMLODIPINE BESYLATE 10 MG/1
10 TABLET ORAL DAILY
Qty: 90 TABLET | Refills: 1 | Status: SHIPPED | OUTPATIENT
Start: 2021-08-12

## 2021-08-12 NOTE — TELEPHONE ENCOUNTER
Please review. Protocol failed or has no protocol.   Requested Prescriptions   Pending Prescriptions Disp Refills    AMLODIPINE BESYLATE 10 MG Oral Tab [Pharmacy Med Name: AMLODIPINE BESYLATE 10MG TABLETS] 90 tablet 0     Sig: TAKE 1 TABLET(10 MG) BY MOUTH

## 2021-08-20 RX ORDER — CITALOPRAM 10 MG/1
TABLET ORAL
Qty: 90 TABLET | Refills: 0 | Status: SHIPPED | OUTPATIENT
Start: 2021-08-20 | End: 2021-11-19

## 2021-08-20 NOTE — TELEPHONE ENCOUNTER
Medication approved. Please encourage patient to either repeat stool testing or preferably colonoscopy. She had a positive stool test in 2019 that she has not followed up on it.   Please inform patient thank you

## 2021-11-10 RX ORDER — DOXAZOSIN 2 MG/1
2 TABLET ORAL NIGHTLY
Qty: 90 TABLET | Refills: 1 | Status: SHIPPED | OUTPATIENT
Start: 2021-11-10

## 2021-11-10 NOTE — TELEPHONE ENCOUNTER
Please review. Protocol failed or has no protocol.   Requested Prescriptions   Pending Prescriptions Disp Refills    DOXAZOSIN 2 MG Oral Tab [Pharmacy Med Name: DOXAZOSIN 2MG TABLETS] 90 tablet 1     Sig: TAKE 1 TABLET BY MOUTH EVERY NIGHT AT BEDTIME

## 2021-11-17 RX ORDER — SIMVASTATIN 40 MG
40 TABLET ORAL DAILY
Qty: 90 TABLET | Refills: 1 | Status: SHIPPED | OUTPATIENT
Start: 2021-11-17

## 2021-11-17 NOTE — TELEPHONE ENCOUNTER
Refill passed per [facility] protocol    Requested Prescriptions   Pending Prescriptions Disp Refills    SIMVASTATIN 40 MG Oral Tab [Pharmacy Med Name: SIMVASTATIN 40MG TABLETS] 90 tablet 1     Sig: TAKE 1 TABLET(40 MG) BY MOUTH DAILY        Ezequiel

## 2021-11-19 ENCOUNTER — TELEPHONE (OUTPATIENT)
Dept: INTERNAL MEDICINE CLINIC | Facility: CLINIC | Age: 61
End: 2021-11-19

## 2021-11-19 RX ORDER — CITALOPRAM 10 MG/1
TABLET ORAL
Qty: 90 TABLET | Refills: 0 | Status: SHIPPED | OUTPATIENT
Start: 2021-11-19

## 2021-11-19 NOTE — TELEPHONE ENCOUNTER
Protocol failed. 90 day refill given on 11/19/21, appointment needed for further refills.     Requested Prescriptions   Pending Prescriptions Disp Refills    CITALOPRAM 10 MG Oral Tab [Pharmacy Med Name: CITALOPRAM 10MG TABLETS] 90 tablet 0     Sig: TAKE

## 2021-11-19 NOTE — TELEPHONE ENCOUNTER
Patient is due for an office visit. Please call patient to schedule an appointment in order to receive any further refills after this. Patient is not MyChart active. 90 day refill given on 11/19/21, appointment needed for further refills.

## 2021-11-23 NOTE — TELEPHONE ENCOUNTER
1st attempt/not able to leave a message. Voice mail box is full. Please, see message below when the patient call back.

## 2022-01-14 RX ORDER — BENAZEPRIL HYDROCHLORIDE 40 MG/1
40 TABLET, FILM COATED ORAL DAILY
Qty: 90 TABLET | Refills: 0 | Status: SHIPPED | OUTPATIENT
Start: 2022-01-14

## 2022-01-14 NOTE — TELEPHONE ENCOUNTER
Please review; protocol failed/ no protocol. *Patient agrees to schedule follow up appt for HTN before next refill.     Requested Prescriptions   Pending Prescriptions Disp Refills    BENAZEPRIL HCL 40 MG Oral Tab [Pharmacy Med Name: BENAZEPRIL 40MG TABLET

## 2022-01-21 RX ORDER — CARVEDILOL 25 MG/1
25 TABLET ORAL 2 TIMES DAILY
Qty: 180 TABLET | Refills: 0 | Status: SHIPPED | OUTPATIENT
Start: 2022-01-21

## 2022-01-21 NOTE — TELEPHONE ENCOUNTER
Please review; protocol failed.     Requested Prescriptions   Pending Prescriptions Disp Refills    CARVEDILOL 25 MG Oral Tab [Pharmacy Med Name: CARVEDILOL 25MG TABLETS] 180 tablet 1     Sig: TAKE 1 TABLET(25 MG) BY MOUTH TWICE DAILY        Hypertensive Me

## 2022-01-31 ENCOUNTER — TELEPHONE (OUTPATIENT)
Dept: INTERNAL MEDICINE CLINIC | Facility: CLINIC | Age: 62
End: 2022-01-31

## 2022-01-31 NOTE — TELEPHONE ENCOUNTER
Patient would like Dr. Ravi Knox to write a letter to excuse her from jury duty on 1/16 due to COPD and back pain. Patient will  at the office when it is ready. Would like to have it as soon as possible.

## 2022-02-01 NOTE — TELEPHONE ENCOUNTER
Patient notified Jen Person is ready for P/u at [place] [address] office. She will have  [employer] Vanessa  the letter today.

## 2022-02-08 RX ORDER — AMLODIPINE BESYLATE 10 MG/1
10 TABLET ORAL DAILY
Qty: 90 TABLET | Refills: 1 | Status: SHIPPED | OUTPATIENT
Start: 2022-02-08

## 2022-02-08 NOTE — TELEPHONE ENCOUNTER
Please review. Protocol Failed or has No Protocol. Requested Prescriptions   Pending Prescriptions Disp Refills    AMLODIPINE 10 MG Oral Tab [Pharmacy Med Name: AMLODIPINE BESYLATE 10MG TABLETS] 90 tablet 1     Sig: TAKE 1 TABLET(10 MG) BY MOUTH DAILY        Hypertensive Medications Protocol Failed - 2/8/2022  7:32 AM        Failed - GFR Non- > 50     Lab Results   Component Value Date    GFRNAA 30 (L) 06/12/2021                 Passed - CMP or BMP in past 12 months        Passed - Appointment in past 6 or next 3 months              Future Appointments         Provider Department Appt Notes    In 1 week Palomo Tolbert MD [facility], [facility] follow up   policy informed.             Recent Outpatient Visits              9 months ago Essential hypertension    Sil Dean MD    Office Visit    1 year ago Essential hypertension    [place] [facility] [place], [facility], Sil Fenton MD    Office Visit    1 year ago Conjunctival hemorrhage, unspecified laterality    [place] [facility] [employer] [place], [facility], Sil Fenton MD    Office Visit    2 years ago Screen for colon cancer    Sil Dean MD    Office Visit    2 years ago [address] Hematology Oncology Zoraida Judge MD    Office Visit

## 2022-02-22 RX ORDER — CITALOPRAM HYDROBROMIDE 10 MG/1
TABLET ORAL
Qty: 90 TABLET | Refills: 1 | Status: SHIPPED | OUTPATIENT
Start: 2022-02-22

## 2022-02-22 NOTE — TELEPHONE ENCOUNTER
Refill passed per [facility] protocol. Future Appointments   Date Time Provider Sasha Hodgson   3/16/2022 12:00 PM Braulio San MD [facility]       Requested Prescriptions   Pending Prescriptions Disp Refills    CITALOPRAM 10 MG Oral Tab [Pharmacy Med Name: CITALOPRAM 10MG TABLETS] 90 tablet 0     Sig: TAKE 1 TABLET BY MOUTH DAILY        Psychiatric Non-Scheduled (Anti-Anxiety) Passed - 2/22/2022 10:32 AM        Passed - Appointment in last 6 or next 3 months             Recent Outpatient Visits              9 months ago Essential hypertension    [facility], [facility], Sil Fenton MD    Office Visit    1 year ago Essential hypertension    [facility], [facility], Sil Fenton MD    Office Visit    1 year ago Conjunctival hemorrhage, unspecified laterality    [facility], Sil Fenton MD    Office Visit    2 years ago Screen for colon cancer    [facility], [facility], Sil Fenton MD    Office Visit    2 years ago [address] Hematology Oncology Arturo Rodriguez MD    Office Visit           Future Appointments         Provider Department Appt Notes    In 3 weeks Braulio San MD [facility], [employer] [facility], [facility] follow up   policy informed.

## 2022-02-25 RX ORDER — SIMVASTATIN 40 MG
40 TABLET ORAL DAILY
Qty: 90 TABLET | Refills: 1 | Status: SHIPPED | OUTPATIENT
Start: 2022-02-25

## 2022-02-25 NOTE — TELEPHONE ENCOUNTER
Refill passed per [facility] protocol. Requested Prescriptions   Pending Prescriptions Disp Refills    SIMVASTATIN 40 MG Oral Tab [Pharmacy Med Name: SIMVASTATIN 40MG TABLETS] 90 tablet 1     Sig: TAKE 1 TABLET(40 MG) BY MOUTH DAILY        Cholesterol Medication Protocol Passed - 2/25/2022  2:22 PM        Passed - ALT in past 12 months        Passed - LDL in past 12 months        Passed - Last ALT < 80       Lab Results   Component Value Date    ALT 25 06/12/2021             Passed - Last LDL < 130     Lab Results   Component Value Date    LDL 96 06/12/2021               Passed - Appointment in past 12 or next 3 months              Recent Outpatient Visits              9 months ago Essential hypertension    [facility], [address], MD Sil    Office Visit    1 year ago Essential hypertension    [facility], [address], MD Sil    Office Visit    1 year ago Conjunctival hemorrhage, unspecified laterality    [facility], [address], MD Sil    Office Visit    2 years ago Screen for colon cancer    Sil Agudelo MD    Office Visit    2 years ago [address] Hematology Oncology Christa Kilgore MD    Office Visit            Future Appointments         Provider Department Appt Notes    In 2 weeks Vicky Marks MD [facility], [address] follow up   policy informed.

## 2022-04-05 RX ORDER — BENAZEPRIL HYDROCHLORIDE 40 MG/1
40 TABLET, FILM COATED ORAL DAILY
Qty: 90 TABLET | Refills: 0 | Status: SHIPPED | OUTPATIENT
Start: 2022-04-05 | End: 2022-07-12

## 2022-04-21 RX ORDER — CARVEDILOL 25 MG/1
25 TABLET ORAL 2 TIMES DAILY
Qty: 180 TABLET | Refills: 0 | Status: SHIPPED | OUTPATIENT
Start: 2022-04-21

## 2022-04-21 NOTE — TELEPHONE ENCOUNTER
Please review. Protocol failed / No protocol.     Requested Prescriptions   Pending Prescriptions Disp Refills    CARVEDILOL 25 MG Oral Tab [Pharmacy Med Name: CARVEDILOL 25MG TABLETS] 180 tablet 0     Sig: TAKE 1 TABLET(25 MG) BY MOUTH TWICE DAILY        Hypertensive Medications Protocol Failed - 4/21/2022 11:01 AM        Failed - Appointment in past 6 or next 3 months        Failed - GFR Non- > 50     Lab Results   Component Value Date    GFRNAA 30 (L) 06/12/2021                 Passed - CMP or BMP in past 12 months                  Recent Outpatient Visits              11 months ago Essential hypertension    [place] [facility], [facility], [address], MD Sil    Office Visit    1 year ago Essential hypertension    [place] [facility], [address] MD    Office Visit    2 years ago Conjunctival hemorrhage, unspecified laterality    [place] [facility], [address] MD    Office Visit    2 years ago Screen for colon cancer    Sil Bundy MD    Office Visit    2 years ago [address] Hematology Oncology Joey Paula MD    Office Visit

## 2022-04-22 NOTE — TELEPHONE ENCOUNTER
RN's please assist to reach and schedule. Ivy Led Her last office visit was 5/10/21. She has canceled multiple visits. She has no my chart.  Thanks

## 2022-05-09 RX ORDER — AMLODIPINE BESYLATE 10 MG/1
TABLET ORAL
Qty: 90 TABLET | Refills: 1 | OUTPATIENT
Start: 2022-05-09

## 2022-05-09 NOTE — TELEPHONE ENCOUNTER
Refill requested too soon. amLODIPine 10 MG Oral Tab 90 tablet 1 2/8/2022     Sig - Route:  Take 1 tablet (10 mg total) by mouth daily. - Oral    Sent to pharmacy as: amLODIPine Besylate 10 MG Oral Tablet (NORVASC)    E-Prescribing Status: Receipt confirmed by pharmacy (2/8/2022 11:15 AM CST)

## 2022-07-12 RX ORDER — BENAZEPRIL HYDROCHLORIDE 40 MG/1
TABLET, FILM COATED ORAL
Qty: 90 TABLET | Refills: 0 | Status: SHIPPED | OUTPATIENT
Start: 2022-07-12

## 2022-07-12 NOTE — TELEPHONE ENCOUNTER
Medication approved. Last seen in May 2021. She is due for appointment.   Please inform her thank you

## 2022-08-08 RX ORDER — DOXAZOSIN 2 MG/1
2 TABLET ORAL NIGHTLY
Qty: 90 TABLET | Refills: 0 | Status: SHIPPED | OUTPATIENT
Start: 2022-08-08

## 2022-08-08 NOTE — TELEPHONE ENCOUNTER
I have approved 90-day supply. Last seen in more than 1 year back. He needs appointment in the next 90 days.   Please inform him thank you

## 2023-04-18 NOTE — TELEPHONE ENCOUNTER
Patient called saying she is out of medication. She has reached out to [facility] and they tell her they have already reached out to the Doctor.        Citalopram Hydrobromide 10 MG Oral Tab No

## 2024-09-24 NOTE — TELEPHONE ENCOUNTER
Doctor's orders relayed to pharmacist Aniceto Ding
Okay to continue.   Please let pharmacy know thank you
Pharmacy calling regarding potential interaction between patient's medications, they are questioning amLODIPine Besylate 10 MG Oral Tab and simvastatin 40 MG Oral Tab.  At these mg's it is not recommended, simvastatin over 20 mg is contraindicated with amlo
Please advise
Yes

## 2025-05-27 NOTE — ED INITIAL ASSESSMENT (HPI)
C/o pain to right buttock that radiates down right leg since yesterday. Denies trauma. (1) Other Diagnosis

## (undated) NOTE — LETTER
11/26/2021              [place] [id number]         Dear Rona Harrisos,    This letter is to inform you that our office has made several attempts to reach you by phone without success.   We were attempting to contact

## (undated) NOTE — LETTER
9/1/2021              [address]        [place]         Dear Lowell Tyler,    This letter is to inform you that our office has made several attempts to reach you by phone without success.   We were attempting to contact y

## (undated) NOTE — LETTER
2/1/2022          To Whom It May Concern:    Van Marino is currently under my medical care. Because of her medical conditions, it is not advisable to do jury duty. Please excuse her if possible.   Thank you    If you have any questions, please contact my office        Sincerely,    Jhonny Flores MD          Document generated by:  Jhonny Flores MD

## (undated) NOTE — ED AVS SNAPSHOT
Rishi Gallardo   MRN: [id number]    Department:  [facility] Emergency Department   Date of Visit:  10/13/2019           Disclosure     Insurance plans vary and the physician(s) referred by the ER may not be covered by your plan.  Please contact your CARE PHYSICIAN AT ONCE OR RETURN IMMEDIATELY TO THE EMERGENCY DEPARTMENT. If you have been prescribed any medication(s), please fill your prescription right away and begin taking the medication(s) as directed.   If you believe that any of the medications